# Patient Record
Sex: FEMALE | Race: WHITE | NOT HISPANIC OR LATINO
[De-identification: names, ages, dates, MRNs, and addresses within clinical notes are randomized per-mention and may not be internally consistent; named-entity substitution may affect disease eponyms.]

---

## 2019-03-12 ENCOUNTER — RESULT REVIEW (OUTPATIENT)
Age: 29
End: 2019-03-12

## 2019-05-07 PROBLEM — Z00.00 ENCOUNTER FOR PREVENTIVE HEALTH EXAMINATION: Status: ACTIVE | Noted: 2019-05-07

## 2019-06-06 ENCOUNTER — APPOINTMENT (OUTPATIENT)
Dept: ANTEPARTUM | Facility: CLINIC | Age: 29
End: 2019-06-06
Payer: COMMERCIAL

## 2019-06-06 ENCOUNTER — ASOB RESULT (OUTPATIENT)
Age: 29
End: 2019-06-06

## 2019-06-06 PROCEDURE — 76813 OB US NUCHAL MEAS 1 GEST: CPT

## 2019-06-06 PROCEDURE — 76801 OB US < 14 WKS SINGLE FETUS: CPT

## 2019-06-06 PROCEDURE — 36416 COLLJ CAPILLARY BLOOD SPEC: CPT

## 2019-07-18 ENCOUNTER — APPOINTMENT (OUTPATIENT)
Dept: ANTEPARTUM | Facility: CLINIC | Age: 29
End: 2019-07-18
Payer: COMMERCIAL

## 2019-07-18 ENCOUNTER — ASOB RESULT (OUTPATIENT)
Age: 29
End: 2019-07-18

## 2019-07-18 PROCEDURE — 76805 OB US >/= 14 WKS SNGL FETUS: CPT

## 2019-10-01 ENCOUNTER — APPOINTMENT (OUTPATIENT)
Dept: MATERNAL FETAL MEDICINE | Facility: CLINIC | Age: 29
End: 2019-10-01
Payer: COMMERCIAL

## 2019-10-01 ENCOUNTER — ASOB RESULT (OUTPATIENT)
Age: 29
End: 2019-10-01

## 2019-10-01 DIAGNOSIS — O24.419 GESTATIONAL DIABETES MELLITUS IN PREGNANCY, UNSPECIFIED CONTROL: ICD-10-CM

## 2019-10-01 PROCEDURE — G0109 DIAB MANAGE TRN IND/GROUP: CPT

## 2019-10-01 RX ORDER — BLOOD SUGAR DIAGNOSTIC
STRIP MISCELLANEOUS 4 TIMES DAILY
Qty: 1 | Refills: 2 | Status: ACTIVE | COMMUNITY
Start: 2019-10-01 | End: 1900-01-01

## 2019-10-01 RX ORDER — LANCETS 30 GAUGE
EACH MISCELLANEOUS
Qty: 1 | Refills: 2 | Status: ACTIVE | COMMUNITY
Start: 2019-10-01 | End: 1900-01-01

## 2019-10-14 ENCOUNTER — APPOINTMENT (OUTPATIENT)
Dept: MATERNAL FETAL MEDICINE | Facility: CLINIC | Age: 29
End: 2019-10-14
Payer: COMMERCIAL

## 2019-10-14 ENCOUNTER — ASOB RESULT (OUTPATIENT)
Age: 29
End: 2019-10-14

## 2019-10-14 PROCEDURE — G0108 DIAB MANAGE TRN  PER INDIV: CPT

## 2019-10-24 ENCOUNTER — ASOB RESULT (OUTPATIENT)
Age: 29
End: 2019-10-24

## 2019-10-24 ENCOUNTER — APPOINTMENT (OUTPATIENT)
Dept: ANTEPARTUM | Facility: CLINIC | Age: 29
End: 2019-10-24
Payer: COMMERCIAL

## 2019-10-24 PROCEDURE — 76816 OB US FOLLOW-UP PER FETUS: CPT

## 2019-10-24 PROCEDURE — 76819 FETAL BIOPHYS PROFIL W/O NST: CPT

## 2019-10-24 PROCEDURE — 99242 OFF/OP CONSLTJ NEW/EST SF 20: CPT | Mod: 25

## 2019-11-06 ENCOUNTER — ASOB RESULT (OUTPATIENT)
Age: 29
End: 2019-11-06

## 2019-11-06 ENCOUNTER — APPOINTMENT (OUTPATIENT)
Dept: MATERNAL FETAL MEDICINE | Facility: CLINIC | Age: 29
End: 2019-11-06
Payer: COMMERCIAL

## 2019-11-06 PROCEDURE — G0108 DIAB MANAGE TRN  PER INDIV: CPT

## 2019-11-20 ENCOUNTER — APPOINTMENT (OUTPATIENT)
Dept: ANTEPARTUM | Facility: CLINIC | Age: 29
End: 2019-11-20
Payer: COMMERCIAL

## 2019-11-20 ENCOUNTER — ASOB RESULT (OUTPATIENT)
Age: 29
End: 2019-11-20

## 2019-11-20 PROCEDURE — 76819 FETAL BIOPHYS PROFIL W/O NST: CPT

## 2019-11-20 PROCEDURE — 76816 OB US FOLLOW-UP PER FETUS: CPT

## 2019-11-25 ENCOUNTER — INPATIENT (INPATIENT)
Facility: HOSPITAL | Age: 29
LOS: 3 days | Discharge: HOME | End: 2019-11-29
Attending: OBSTETRICS & GYNECOLOGY | Admitting: OBSTETRICS & GYNECOLOGY

## 2019-11-25 VITALS
SYSTOLIC BLOOD PRESSURE: 141 MMHG | TEMPERATURE: 98 F | HEART RATE: 83 BPM | DIASTOLIC BLOOD PRESSURE: 92 MMHG | RESPIRATION RATE: 15 BRPM

## 2019-11-25 DIAGNOSIS — Z3A.00 WEEKS OF GESTATION OF PREGNANCY NOT SPECIFIED: ICD-10-CM

## 2019-11-25 DIAGNOSIS — O26.899 OTHER SPECIFIED PREGNANCY RELATED CONDITIONS, UNSPECIFIED TRIMESTER: ICD-10-CM

## 2019-11-25 LAB
APPEARANCE UR: CLEAR — SIGNIFICANT CHANGE UP
APTT BLD: 27 SEC — LOW (ref 27.5–36.3)
BASOPHILS # BLD AUTO: 0.01 K/UL — SIGNIFICANT CHANGE UP (ref 0–0.2)
BASOPHILS NFR BLD AUTO: 0.1 % — SIGNIFICANT CHANGE UP (ref 0–2)
BILIRUB UR-MCNC: NEGATIVE — SIGNIFICANT CHANGE UP
BLOOD UR QL VISUAL: NEGATIVE — SIGNIFICANT CHANGE UP
COLOR SPEC: COLORLESS — SIGNIFICANT CHANGE UP
CREAT ?TM UR-MCNC: 10.2 MG/DL — SIGNIFICANT CHANGE UP
EOSINOPHIL # BLD AUTO: 0.01 K/UL — SIGNIFICANT CHANGE UP (ref 0–0.5)
EOSINOPHIL NFR BLD AUTO: 0.1 % — SIGNIFICANT CHANGE UP (ref 0–6)
FIBRINOGEN PPP-MCNC: 798 MG/DL — HIGH (ref 350–510)
GLUCOSE BLDC GLUCOMTR-MCNC: 86 MG/DL — SIGNIFICANT CHANGE UP (ref 70–99)
GLUCOSE UR-MCNC: NEGATIVE — SIGNIFICANT CHANGE UP
HCT VFR BLD CALC: 39.3 % — SIGNIFICANT CHANGE UP (ref 34.5–45)
HGB BLD-MCNC: 13.9 G/DL — SIGNIFICANT CHANGE UP (ref 11.5–15.5)
IMM GRANULOCYTES NFR BLD AUTO: 0.2 % — SIGNIFICANT CHANGE UP (ref 0–1.5)
INR BLD: 0.96 — SIGNIFICANT CHANGE UP (ref 0.88–1.17)
KETONES UR-MCNC: HIGH
LEUKOCYTE ESTERASE UR-ACNC: NEGATIVE — SIGNIFICANT CHANGE UP
LYMPHOCYTES # BLD AUTO: 2.03 K/UL — SIGNIFICANT CHANGE UP (ref 1–3.3)
LYMPHOCYTES # BLD AUTO: 23.9 % — SIGNIFICANT CHANGE UP (ref 13–44)
MCHC RBC-ENTMCNC: 31.4 PG — SIGNIFICANT CHANGE UP (ref 27–34)
MCHC RBC-ENTMCNC: 35.4 % — SIGNIFICANT CHANGE UP (ref 32–36)
MCV RBC AUTO: 88.9 FL — SIGNIFICANT CHANGE UP (ref 80–100)
MONOCYTES # BLD AUTO: 0.56 K/UL — SIGNIFICANT CHANGE UP (ref 0–0.9)
MONOCYTES NFR BLD AUTO: 6.6 % — SIGNIFICANT CHANGE UP (ref 2–14)
NEUTROPHILS # BLD AUTO: 5.85 K/UL — SIGNIFICANT CHANGE UP (ref 1.8–7.4)
NEUTROPHILS NFR BLD AUTO: 69.1 % — SIGNIFICANT CHANGE UP (ref 43–77)
NITRITE UR-MCNC: NEGATIVE — SIGNIFICANT CHANGE UP
NRBC # FLD: 0 K/UL — SIGNIFICANT CHANGE UP (ref 0–0)
PH UR: 6.5 — SIGNIFICANT CHANGE UP (ref 5–8)
PLATELET # BLD AUTO: 254 K/UL — SIGNIFICANT CHANGE UP (ref 150–400)
PMV BLD: 10 FL — SIGNIFICANT CHANGE UP (ref 7–13)
PROT UR-MCNC: NEGATIVE — SIGNIFICANT CHANGE UP
PROTHROM AB SERPL-ACNC: 10.9 SEC — SIGNIFICANT CHANGE UP (ref 9.8–13.1)
RBC # BLD: 4.42 M/UL — SIGNIFICANT CHANGE UP (ref 3.8–5.2)
RBC # FLD: 12.4 % — SIGNIFICANT CHANGE UP (ref 10.3–14.5)
SP GR SPEC: 1 — LOW (ref 1–1.04)
UROBILINOGEN FLD QL: NORMAL — SIGNIFICANT CHANGE UP
WBC # BLD: 8.48 K/UL — SIGNIFICANT CHANGE UP (ref 3.8–10.5)
WBC # FLD AUTO: 8.48 K/UL — SIGNIFICANT CHANGE UP (ref 3.8–10.5)

## 2019-11-25 RX ORDER — SODIUM CHLORIDE 9 MG/ML
3 INJECTION INTRAMUSCULAR; INTRAVENOUS; SUBCUTANEOUS EVERY 8 HOURS
Refills: 0 | Status: DISCONTINUED | OUTPATIENT
Start: 2019-11-25 | End: 2019-11-27

## 2019-11-25 NOTE — OB PROVIDER TRIAGE NOTE - NSOBPROVIDERNOTE_OBGYN_ALL_OB_FT
30yo  @38.0wks presents with elevated BP and p/c ratio of .39 concurrent with PEC    Plan:  -admit to L&D  -[facility]LL[facility] labs q6h  -IVH  -efm, toco  -4PO    d/w Dr. William Riley, PGY1

## 2019-11-25 NOTE — OB PROVIDER TRIAGE NOTE - HISTORY OF PRESENT ILLNESS
28yo  @38wks presents with elevated BP in office today.  She states BP was 124/90 30yo  @38wks presents with elevated BP in office today.  She states BP was 124/90 with 3x repeats of similar values in the office.  Upon arrival here she had 144/92 and 144/96.  Patient is asymptomatic and denies headache/blurry vision/SOB/RUQ pain/swelling/nausea/vomiting/fevers.  LOF-, Ctx-, FM+, VB-.    PNC: GDMA1  OBhx: denies  PMH: denies  PSH: denies  Meds: denies  Allergies: NKDA    GBS: pos  EFW: 3000

## 2019-11-26 LAB
ALBUMIN SERPL ELPH-MCNC: 3.1 G/DL — LOW (ref 3.3–5)
ALBUMIN SERPL ELPH-MCNC: 3.4 G/DL — SIGNIFICANT CHANGE UP (ref 3.3–5)
ALBUMIN SERPL ELPH-MCNC: 3.6 G/DL — SIGNIFICANT CHANGE UP (ref 3.3–5)
ALP SERPL-CCNC: 124 U/L — HIGH (ref 40–120)
ALP SERPL-CCNC: 127 U/L — HIGH (ref 40–120)
ALP SERPL-CCNC: 136 U/L — HIGH (ref 40–120)
ALT FLD-CCNC: 7 U/L — SIGNIFICANT CHANGE UP (ref 4–33)
ALT FLD-CCNC: 8 U/L — SIGNIFICANT CHANGE UP (ref 4–33)
ALT FLD-CCNC: 9 U/L — SIGNIFICANT CHANGE UP (ref 4–33)
ANION GAP SERPL CALC-SCNC: 11 MMO/L — SIGNIFICANT CHANGE UP (ref 7–14)
ANION GAP SERPL CALC-SCNC: 14 MMO/L — SIGNIFICANT CHANGE UP (ref 7–14)
ANION GAP SERPL CALC-SCNC: 15 MMO/L — HIGH (ref 7–14)
APTT BLD: 26.5 SEC — LOW (ref 27.5–36.3)
APTT BLD: 29 SEC — SIGNIFICANT CHANGE UP (ref 27.5–36.3)
AST SERPL-CCNC: 15 U/L — SIGNIFICANT CHANGE UP (ref 4–32)
AST SERPL-CCNC: 15 U/L — SIGNIFICANT CHANGE UP (ref 4–32)
AST SERPL-CCNC: 18 U/L — SIGNIFICANT CHANGE UP (ref 4–32)
BASOPHILS # BLD AUTO: 0.01 K/UL — SIGNIFICANT CHANGE UP (ref 0–0.2)
BASOPHILS # BLD AUTO: 0.02 K/UL — SIGNIFICANT CHANGE UP (ref 0–0.2)
BASOPHILS NFR BLD AUTO: 0.1 % — SIGNIFICANT CHANGE UP (ref 0–2)
BASOPHILS NFR BLD AUTO: 0.4 % — SIGNIFICANT CHANGE UP (ref 0–2)
BILIRUB SERPL-MCNC: 0.5 MG/DL — SIGNIFICANT CHANGE UP (ref 0.2–1.2)
BILIRUB SERPL-MCNC: 0.5 MG/DL — SIGNIFICANT CHANGE UP (ref 0.2–1.2)
BILIRUB SERPL-MCNC: 0.6 MG/DL — SIGNIFICANT CHANGE UP (ref 0.2–1.2)
BUN SERPL-MCNC: 3 MG/DL — LOW (ref 7–23)
BUN SERPL-MCNC: 5 MG/DL — LOW (ref 7–23)
BUN SERPL-MCNC: 6 MG/DL — LOW (ref 7–23)
CALCIUM SERPL-MCNC: 8.3 MG/DL — LOW (ref 8.4–10.5)
CALCIUM SERPL-MCNC: 8.5 MG/DL — SIGNIFICANT CHANGE UP (ref 8.4–10.5)
CALCIUM SERPL-MCNC: 8.9 MG/DL — SIGNIFICANT CHANGE UP (ref 8.4–10.5)
CHLORIDE SERPL-SCNC: 104 MMOL/L — SIGNIFICANT CHANGE UP (ref 98–107)
CHLORIDE SERPL-SCNC: 108 MMOL/L — HIGH (ref 98–107)
CHLORIDE SERPL-SCNC: 112 MMOL/L — HIGH (ref 98–107)
CO2 SERPL-SCNC: 16 MMOL/L — LOW (ref 22–31)
CO2 SERPL-SCNC: 17 MMOL/L — LOW (ref 22–31)
CO2 SERPL-SCNC: 18 MMOL/L — LOW (ref 22–31)
CREAT SERPL-MCNC: 0.39 MG/DL — LOW (ref 0.5–1.3)
CREAT SERPL-MCNC: 0.41 MG/DL — LOW (ref 0.5–1.3)
CREAT SERPL-MCNC: 0.44 MG/DL — LOW (ref 0.5–1.3)
EOSINOPHIL # BLD AUTO: 0.01 K/UL — SIGNIFICANT CHANGE UP (ref 0–0.5)
EOSINOPHIL # BLD AUTO: 0.05 K/UL — SIGNIFICANT CHANGE UP (ref 0–0.5)
EOSINOPHIL NFR BLD AUTO: 0.2 % — SIGNIFICANT CHANGE UP (ref 0–6)
EOSINOPHIL NFR BLD AUTO: 0.5 % — SIGNIFICANT CHANGE UP (ref 0–6)
FIBRINOGEN PPP-MCNC: 653 MG/DL — HIGH (ref 350–510)
FIBRINOGEN PPP-MCNC: 846 MG/DL — HIGH (ref 350–510)
GLUCOSE BLDC GLUCOMTR-MCNC: 105 MG/DL — HIGH (ref 70–99)
GLUCOSE BLDC GLUCOMTR-MCNC: 108 MG/DL — HIGH (ref 70–99)
GLUCOSE BLDC GLUCOMTR-MCNC: 166 MG/DL — HIGH (ref 70–99)
GLUCOSE BLDC GLUCOMTR-MCNC: 89 MG/DL — SIGNIFICANT CHANGE UP (ref 70–99)
GLUCOSE BLDC GLUCOMTR-MCNC: 94 MG/DL — SIGNIFICANT CHANGE UP (ref 70–99)
GLUCOSE SERPL-MCNC: 113 MG/DL — HIGH (ref 70–99)
GLUCOSE SERPL-MCNC: 85 MG/DL — SIGNIFICANT CHANGE UP (ref 70–99)
GLUCOSE SERPL-MCNC: 87 MG/DL — SIGNIFICANT CHANGE UP (ref 70–99)
HCT VFR BLD CALC: 38.5 % — SIGNIFICANT CHANGE UP (ref 34.5–45)
HCT VFR BLD CALC: 39.5 % — SIGNIFICANT CHANGE UP (ref 34.5–45)
HGB BLD-MCNC: 13.3 G/DL — SIGNIFICANT CHANGE UP (ref 11.5–15.5)
HGB BLD-MCNC: 13.3 G/DL — SIGNIFICANT CHANGE UP (ref 11.5–15.5)
IMM GRANULOCYTES NFR BLD AUTO: 0.2 % — SIGNIFICANT CHANGE UP (ref 0–1.5)
IMM GRANULOCYTES NFR BLD AUTO: 0.2 % — SIGNIFICANT CHANGE UP (ref 0–1.5)
INR BLD: 0.96 — SIGNIFICANT CHANGE UP (ref 0.88–1.17)
INR BLD: 0.97 — SIGNIFICANT CHANGE UP (ref 0.88–1.17)
LDH SERPL L TO P-CCNC: 128 U/L — LOW (ref 135–225)
LDH SERPL L TO P-CCNC: 146 U/L — SIGNIFICANT CHANGE UP (ref 135–225)
LDH SERPL L TO P-CCNC: 170 U/L — SIGNIFICANT CHANGE UP (ref 135–225)
LYMPHOCYTES # BLD AUTO: 1.52 K/UL — SIGNIFICANT CHANGE UP (ref 1–3.3)
LYMPHOCYTES # BLD AUTO: 1.77 K/UL — SIGNIFICANT CHANGE UP (ref 1–3.3)
LYMPHOCYTES # BLD AUTO: 19.1 % — SIGNIFICANT CHANGE UP (ref 13–44)
LYMPHOCYTES # BLD AUTO: 28.4 % — SIGNIFICANT CHANGE UP (ref 13–44)
MCHC RBC-ENTMCNC: 30.6 PG — SIGNIFICANT CHANGE UP (ref 27–34)
MCHC RBC-ENTMCNC: 31.1 PG — SIGNIFICANT CHANGE UP (ref 27–34)
MCHC RBC-ENTMCNC: 33.7 % — SIGNIFICANT CHANGE UP (ref 32–36)
MCHC RBC-ENTMCNC: 34.5 % — SIGNIFICANT CHANGE UP (ref 32–36)
MCV RBC AUTO: 90.2 FL — SIGNIFICANT CHANGE UP (ref 80–100)
MCV RBC AUTO: 90.8 FL — SIGNIFICANT CHANGE UP (ref 80–100)
MONOCYTES # BLD AUTO: 0.43 K/UL — SIGNIFICANT CHANGE UP (ref 0–0.9)
MONOCYTES # BLD AUTO: 0.64 K/UL — SIGNIFICANT CHANGE UP (ref 0–0.9)
MONOCYTES NFR BLD AUTO: 6.9 % — SIGNIFICANT CHANGE UP (ref 2–14)
MONOCYTES NFR BLD AUTO: 8 % — SIGNIFICANT CHANGE UP (ref 2–14)
NEUTROPHILS # BLD AUTO: 3.37 K/UL — SIGNIFICANT CHANGE UP (ref 1.8–7.4)
NEUTROPHILS # BLD AUTO: 6.76 K/UL — SIGNIFICANT CHANGE UP (ref 1.8–7.4)
NEUTROPHILS NFR BLD AUTO: 62.8 % — SIGNIFICANT CHANGE UP (ref 43–77)
NEUTROPHILS NFR BLD AUTO: 73.2 % — SIGNIFICANT CHANGE UP (ref 43–77)
NRBC # FLD: 0 K/UL — SIGNIFICANT CHANGE UP (ref 0–0)
NRBC # FLD: 0 K/UL — SIGNIFICANT CHANGE UP (ref 0–0)
PLATELET # BLD AUTO: 231 K/UL — SIGNIFICANT CHANGE UP (ref 150–400)
PLATELET # BLD AUTO: 244 K/UL — SIGNIFICANT CHANGE UP (ref 150–400)
PMV BLD: 10 FL — SIGNIFICANT CHANGE UP (ref 7–13)
PMV BLD: 9.9 FL — SIGNIFICANT CHANGE UP (ref 7–13)
POTASSIUM SERPL-MCNC: 3.1 MMOL/L — LOW (ref 3.5–5.3)
POTASSIUM SERPL-MCNC: 3.3 MMOL/L — LOW (ref 3.5–5.3)
POTASSIUM SERPL-MCNC: 3.4 MMOL/L — LOW (ref 3.5–5.3)
POTASSIUM SERPL-SCNC: 3.1 MMOL/L — LOW (ref 3.5–5.3)
POTASSIUM SERPL-SCNC: 3.3 MMOL/L — LOW (ref 3.5–5.3)
POTASSIUM SERPL-SCNC: 3.4 MMOL/L — LOW (ref 3.5–5.3)
PROT SERPL-MCNC: 6 G/DL — SIGNIFICANT CHANGE UP (ref 6–8.3)
PROT SERPL-MCNC: 6.1 G/DL — SIGNIFICANT CHANGE UP (ref 6–8.3)
PROT SERPL-MCNC: 6.9 G/DL — SIGNIFICANT CHANGE UP (ref 6–8.3)
PROT UR-MCNC: < 4 MG/DL — SIGNIFICANT CHANGE UP
PROTHROM AB SERPL-ACNC: 10.9 SEC — SIGNIFICANT CHANGE UP (ref 9.8–13.1)
PROTHROM AB SERPL-ACNC: 11 SEC — SIGNIFICANT CHANGE UP (ref 9.8–13.1)
RBC # BLD: 4.27 M/UL — SIGNIFICANT CHANGE UP (ref 3.8–5.2)
RBC # BLD: 4.35 M/UL — SIGNIFICANT CHANGE UP (ref 3.8–5.2)
RBC # FLD: 12.6 % — SIGNIFICANT CHANGE UP (ref 10.3–14.5)
RBC # FLD: 12.7 % — SIGNIFICANT CHANGE UP (ref 10.3–14.5)
SODIUM SERPL-SCNC: 137 MMOL/L — SIGNIFICANT CHANGE UP (ref 135–145)
SODIUM SERPL-SCNC: 138 MMOL/L — SIGNIFICANT CHANGE UP (ref 135–145)
SODIUM SERPL-SCNC: 140 MMOL/L — SIGNIFICANT CHANGE UP (ref 135–145)
T PALLIDUM AB TITR SER: NEGATIVE — SIGNIFICANT CHANGE UP
URATE SERPL-MCNC: 3.6 MG/DL — SIGNIFICANT CHANGE UP (ref 2.5–7)
URATE SERPL-MCNC: 4.1 MG/DL — SIGNIFICANT CHANGE UP (ref 2.5–7)
URATE SERPL-MCNC: 4.2 MG/DL — SIGNIFICANT CHANGE UP (ref 2.5–7)
WBC # BLD: 5.36 K/UL — SIGNIFICANT CHANGE UP (ref 3.8–10.5)
WBC # BLD: 9.25 K/UL — SIGNIFICANT CHANGE UP (ref 3.8–10.5)
WBC # FLD AUTO: 5.36 K/UL — SIGNIFICANT CHANGE UP (ref 3.8–10.5)
WBC # FLD AUTO: 9.25 K/UL — SIGNIFICANT CHANGE UP (ref 3.8–10.5)

## 2019-11-26 RX ORDER — AMPICILLIN TRIHYDRATE 250 MG
1 CAPSULE ORAL EVERY 4 HOURS
Refills: 0 | Status: DISCONTINUED | OUTPATIENT
Start: 2019-11-26 | End: 2019-11-26

## 2019-11-26 RX ORDER — CITRIC ACID/SODIUM CITRATE 300-500 MG
15 SOLUTION, ORAL ORAL EVERY 6 HOURS
Refills: 0 | Status: DISCONTINUED | OUTPATIENT
Start: 2019-11-26 | End: 2019-11-27

## 2019-11-26 RX ORDER — SODIUM CHLORIDE 9 MG/ML
1000 INJECTION, SOLUTION INTRAVENOUS
Refills: 0 | Status: DISCONTINUED | OUTPATIENT
Start: 2019-11-26 | End: 2019-11-27

## 2019-11-26 RX ORDER — VANCOMYCIN HCL 1 G
VIAL (EA) INTRAVENOUS
Refills: 0 | Status: DISCONTINUED | OUTPATIENT
Start: 2019-11-26 | End: 2019-11-27

## 2019-11-26 RX ORDER — POTASSIUM CHLORIDE 20 MEQ
40 PACKET (EA) ORAL EVERY 4 HOURS
Refills: 0 | Status: COMPLETED | OUTPATIENT
Start: 2019-11-26 | End: 2019-11-27

## 2019-11-26 RX ORDER — AMPICILLIN TRIHYDRATE 250 MG
2 CAPSULE ORAL ONCE
Refills: 0 | Status: COMPLETED | OUTPATIENT
Start: 2019-11-26 | End: 2019-11-26

## 2019-11-26 RX ORDER — SODIUM CHLORIDE 9 MG/ML
1000 INJECTION INTRAMUSCULAR; INTRAVENOUS; SUBCUTANEOUS
Refills: 0 | Status: COMPLETED | OUTPATIENT
Start: 2019-11-26 | End: 2019-11-26

## 2019-11-26 RX ORDER — BUTORPHANOL TARTRATE 2 MG/ML
0.2 INJECTION, SOLUTION INTRAMUSCULAR; INTRAVENOUS ONCE
Refills: 0 | Status: DISCONTINUED | OUTPATIENT
Start: 2019-11-26 | End: 2019-11-26

## 2019-11-26 RX ORDER — SODIUM CHLORIDE 9 MG/ML
1000 INJECTION INTRAMUSCULAR; INTRAVENOUS; SUBCUTANEOUS
Refills: 0 | Status: DISCONTINUED | OUTPATIENT
Start: 2019-11-26 | End: 2019-11-26

## 2019-11-26 RX ORDER — DIPHENHYDRAMINE HCL 50 MG
25 CAPSULE ORAL ONCE
Refills: 0 | Status: COMPLETED | OUTPATIENT
Start: 2019-11-26 | End: 2019-11-26

## 2019-11-26 RX ORDER — CITRIC ACID/SODIUM CITRATE 300-500 MG
15 SOLUTION, ORAL ORAL EVERY 6 HOURS
Refills: 0 | Status: DISCONTINUED | OUTPATIENT
Start: 2019-11-26 | End: 2019-11-26

## 2019-11-26 RX ORDER — VANCOMYCIN HCL 1 G
1000 VIAL (EA) INTRAVENOUS ONCE
Refills: 0 | Status: COMPLETED | OUTPATIENT
Start: 2019-11-26 | End: 2019-11-26

## 2019-11-26 RX ORDER — VANCOMYCIN HCL 1 G
1000 VIAL (EA) INTRAVENOUS EVERY 12 HOURS
Refills: 0 | Status: DISCONTINUED | OUTPATIENT
Start: 2019-11-26 | End: 2019-11-27

## 2019-11-26 RX ORDER — OXYTOCIN 10 UNIT/ML
333.33 VIAL (ML) INJECTION
Qty: 20 | Refills: 0 | Status: DISCONTINUED | OUTPATIENT
Start: 2019-11-26 | End: 2019-11-27

## 2019-11-26 RX ORDER — SODIUM CHLORIDE 9 MG/ML
1000 INJECTION, SOLUTION INTRAVENOUS
Refills: 0 | Status: DISCONTINUED | OUTPATIENT
Start: 2019-11-26 | End: 2019-11-26

## 2019-11-26 RX ORDER — BUTORPHANOL TARTRATE 2 MG/ML
2 INJECTION, SOLUTION INTRAMUSCULAR; INTRAVENOUS ONCE
Refills: 0 | Status: DISCONTINUED | OUTPATIENT
Start: 2019-11-26 | End: 2019-11-26

## 2019-11-26 RX ORDER — OXYTOCIN 10 UNIT/ML
333.33 VIAL (ML) INJECTION
Qty: 20 | Refills: 0 | Status: DISCONTINUED | OUTPATIENT
Start: 2019-11-26 | End: 2019-11-26

## 2019-11-26 RX ADMIN — Medication 25 MILLIGRAM(S): at 04:22

## 2019-11-26 RX ADMIN — SODIUM CHLORIDE 3 MILLILITER(S): 9 INJECTION INTRAMUSCULAR; INTRAVENOUS; SUBCUTANEOUS at 05:26

## 2019-11-26 RX ADMIN — BUTORPHANOL TARTRATE 2 MILLIGRAM(S): 2 INJECTION, SOLUTION INTRAMUSCULAR; INTRAVENOUS at 21:53

## 2019-11-26 RX ADMIN — Medication 216 GRAM(S): at 03:06

## 2019-11-26 RX ADMIN — Medication 250 MILLIGRAM(S): at 19:38

## 2019-11-26 RX ADMIN — BUTORPHANOL TARTRATE 2 MILLIGRAM(S): 2 INJECTION, SOLUTION INTRAMUSCULAR; INTRAVENOUS at 21:23

## 2019-11-26 RX ADMIN — Medication 250 MILLIGRAM(S): at 07:54

## 2019-11-26 RX ADMIN — SODIUM CHLORIDE 125 MILLILITER(S): 9 INJECTION INTRAMUSCULAR; INTRAVENOUS; SUBCUTANEOUS at 22:35

## 2019-11-26 NOTE — CHART NOTE - NSCHARTNOTEFT_GEN_A_CORE
Patient assessed at bedside for cervical change.  She requests something for pain at this time.    VS  T(C): 36.6 (11-26-19 @ 13:57)  HR: 76 (11-26-19 @ 18:50)  BP: 139/83 (11-26-19 @ 18:50)  RR: 16 (11-26-19 @ 18:50)  SpO2: 99% (11-26-19 @ 18:50)    SVE:1/80/-3  EFM:   [place]:    Plan:  -c/w PO cytotec  -Stadol now    d/w Dr. Kassidy Riley, PGY1 Patient assessed at bedside for cervical change.  She requests something for pain at this time.    VS  T(C): 36.6 (11-26-19 @ 13:57)  HR: 76 (11-26-19 @ 18:50)  BP: 139/83 (11-26-19 @ 18:50)  RR: 16 (11-26-19 @ 18:50)  SpO2: 99% (11-26-19 @ 18:50)    SVE:1/80/-3  EFM: 140, mod, +accels, -decels  [place]: q2-5min    Plan:  -c/w PO cytotec  -Stadol now    d/w Dr. Kassidy Riley, PGY1

## 2019-11-26 NOTE — OB PROVIDER H&P - ASSESSMENT
28yo  @38.0wks IOL  PEC    Plan:  -admit to L&D  -[facility] labs q6h  -IVH  -efm, toco  -ampicillin  -4PO    d/w Dr. William Riley, PGY1

## 2019-11-26 NOTE — CHART NOTE - NSCHARTNOTEFT_GEN_A_CORE
PGY1 Note    Patient evaluated for cervical change and placement of cervical balloon.      VS  T(C): 36.8 (11-26-19 @ 10:03)  HR: 85 (11-26-19 @ 11:48)  BP: 138/94 (11-26-19 @ 11:48)  RR: 16 (11-26-19 @ 10:03)  SpO2: 99% (11-26-19 @ 10:03)    SVE: 0.5/50/-3, posterior   FHR: 135/mod dafne/+acel/-decel: cat 1  [place]: Irregular ctx pattern seen    CB unable to be placed; will further ripen with PO Cytotec  Attempt CB later    D/w Dr. Kassidy Dowling PGY1

## 2019-11-26 NOTE — CHART NOTE - NSCHARTNOTEFT_GEN_A_CORE
PGY1 Note    Patient evaluated for cervical change and placement of cook balloon.     VS  T(C): 36.6 (11-26-19 @ 13:57)  HR: 92 (11-26-19 @ 13:57)  BP: 128/87 (11-26-19 @ 13:57)  RR: 18 (11-26-19 @ 13:57)  SpO2: 99% (11-26-19 @ 13:57)      SVE: 0.5/50/-3, softer, more anterior   FHR: 140/mod dafne/+acel/-decel: cat 1  [place]: ctx q2    2nd attempt at placement of cervical balloon unsuccessful   -Consider placing vaginal Cytotec once patient completes PO  -Will place cook balloon later in the evening    d/w Dr. Kassidy Dowling PGY1

## 2019-11-26 NOTE — OB PROVIDER H&P - HISTORY OF PRESENT ILLNESS
30yo  @38wks presents with elevated BP in office today.  She states BP was 124/90 with 3x repeats of similar values in the office.  Upon arrival here she had 144/92 and 144/96.  Patient is asymptomatic and denies headache/blurry vision/SOB/RUQ pain/swelling/nausea/vomiting/fevers.  LOF-, Ctx-, FM+, VB-.    PNC: GDMA1  OBhx: denies  PMH: denies  PSH: denies  Meds: denies  Allergies: NKDA    GBS: pos  EFW: 3000

## 2019-11-26 NOTE — CHART NOTE - NSCHARTNOTEFT_GEN_A_CORE
Patient assessed at bedside for reports of rash since ampicillin administration.  Rash is confined to neck and shoulders and is itchy, without hives present.  Patient reports no difficulties breathing.  She has a known allergy to sulfa drugs and now explains that in childhood she would be allergic to random medications but sensitivities were never done.      Administering benadryl to patient and changing GBS prophylactic abx to Vancomycin.    d/w Dr. Blade Riley, PGY1

## 2019-11-27 ENCOUNTER — TRANSCRIPTION ENCOUNTER (OUTPATIENT)
Age: 29
End: 2019-11-27

## 2019-11-27 LAB
ANION GAP SERPL CALC-SCNC: 13 MMO/L — SIGNIFICANT CHANGE UP (ref 7–14)
BLD GP AB SCN SERPL QL: NEGATIVE — SIGNIFICANT CHANGE UP
BUN SERPL-MCNC: 8 MG/DL — SIGNIFICANT CHANGE UP (ref 7–23)
CALCIUM SERPL-MCNC: 8.4 MG/DL — SIGNIFICANT CHANGE UP (ref 8.4–10.5)
CHLORIDE SERPL-SCNC: 107 MMOL/L — SIGNIFICANT CHANGE UP (ref 98–107)
CO2 SERPL-SCNC: 18 MMOL/L — LOW (ref 22–31)
CREAT SERPL-MCNC: 0.64 MG/DL — SIGNIFICANT CHANGE UP (ref 0.5–1.3)
GLUCOSE BLDC GLUCOMTR-MCNC: 88 MG/DL — SIGNIFICANT CHANGE UP (ref 70–99)
GLUCOSE SERPL-MCNC: 134 MG/DL — HIGH (ref 70–99)
POTASSIUM SERPL-MCNC: 3.8 MMOL/L — SIGNIFICANT CHANGE UP (ref 3.5–5.3)
POTASSIUM SERPL-SCNC: 3.8 MMOL/L — SIGNIFICANT CHANGE UP (ref 3.5–5.3)
RH IG SCN BLD-IMP: POSITIVE — SIGNIFICANT CHANGE UP
SODIUM SERPL-SCNC: 138 MMOL/L — SIGNIFICANT CHANGE UP (ref 135–145)

## 2019-11-27 RX ORDER — HYDROCORTISONE 1 %
1 OINTMENT (GRAM) TOPICAL EVERY 6 HOURS
Refills: 0 | Status: DISCONTINUED | OUTPATIENT
Start: 2019-11-27 | End: 2019-11-29

## 2019-11-27 RX ORDER — DIBUCAINE 1 %
1 OINTMENT (GRAM) RECTAL EVERY 6 HOURS
Refills: 0 | Status: DISCONTINUED | OUTPATIENT
Start: 2019-11-27 | End: 2019-11-29

## 2019-11-27 RX ORDER — KETOROLAC TROMETHAMINE 30 MG/ML
30 SYRINGE (ML) INJECTION ONCE
Refills: 0 | Status: DISCONTINUED | OUTPATIENT
Start: 2019-11-27 | End: 2019-11-27

## 2019-11-27 RX ORDER — BENZOCAINE 10 %
1 GEL (GRAM) MUCOUS MEMBRANE EVERY 6 HOURS
Refills: 0 | Status: DISCONTINUED | OUTPATIENT
Start: 2019-11-27 | End: 2019-11-29

## 2019-11-27 RX ORDER — OXYTOCIN 10 UNIT/ML
333.33 VIAL (ML) INJECTION
Qty: 20 | Refills: 0 | Status: DISCONTINUED | OUTPATIENT
Start: 2019-11-27 | End: 2019-11-27

## 2019-11-27 RX ORDER — SIMETHICONE 80 MG/1
80 TABLET, CHEWABLE ORAL EVERY 4 HOURS
Refills: 0 | Status: DISCONTINUED | OUTPATIENT
Start: 2019-11-27 | End: 2019-11-29

## 2019-11-27 RX ORDER — GLYCERIN ADULT
1 SUPPOSITORY, RECTAL RECTAL AT BEDTIME
Refills: 0 | Status: DISCONTINUED | OUTPATIENT
Start: 2019-11-27 | End: 2019-11-29

## 2019-11-27 RX ORDER — IBUPROFEN 200 MG
600 TABLET ORAL EVERY 6 HOURS
Refills: 0 | Status: COMPLETED | OUTPATIENT
Start: 2019-11-27 | End: 2020-10-25

## 2019-11-27 RX ORDER — IBUPROFEN 200 MG
1 TABLET ORAL
Qty: 0 | Refills: 0 | DISCHARGE
Start: 2019-11-27

## 2019-11-27 RX ORDER — DIPHENHYDRAMINE HCL 50 MG
25 CAPSULE ORAL EVERY 6 HOURS
Refills: 0 | Status: DISCONTINUED | OUTPATIENT
Start: 2019-11-27 | End: 2019-11-29

## 2019-11-27 RX ORDER — IBUPROFEN 200 MG
600 TABLET ORAL EVERY 6 HOURS
Refills: 0 | Status: DISCONTINUED | OUTPATIENT
Start: 2019-11-27 | End: 2019-11-29

## 2019-11-27 RX ORDER — ACETAMINOPHEN 500 MG
975 TABLET ORAL
Refills: 0 | Status: DISCONTINUED | OUTPATIENT
Start: 2019-11-27 | End: 2019-11-29

## 2019-11-27 RX ORDER — AER TRAVELER 0.5 G/1
1 SOLUTION RECTAL; TOPICAL EVERY 4 HOURS
Refills: 0 | Status: DISCONTINUED | OUTPATIENT
Start: 2019-11-27 | End: 2019-11-29

## 2019-11-27 RX ORDER — TETANUS TOXOID, REDUCED DIPHTHERIA TOXOID AND ACELLULAR PERTUSSIS VACCINE, ADSORBED 5; 2.5; 8; 8; 2.5 [IU]/.5ML; [IU]/.5ML; UG/.5ML; UG/.5ML; UG/.5ML
0.5 SUSPENSION INTRAMUSCULAR ONCE
Refills: 0 | Status: DISCONTINUED | OUTPATIENT
Start: 2019-11-27 | End: 2019-11-29

## 2019-11-27 RX ORDER — SODIUM CHLORIDE 9 MG/ML
3 INJECTION INTRAMUSCULAR; INTRAVENOUS; SUBCUTANEOUS EVERY 8 HOURS
Refills: 0 | Status: DISCONTINUED | OUTPATIENT
Start: 2019-11-27 | End: 2019-11-29

## 2019-11-27 RX ORDER — MAGNESIUM HYDROXIDE 400 MG/1
30 TABLET, CHEWABLE ORAL
Refills: 0 | Status: DISCONTINUED | OUTPATIENT
Start: 2019-11-27 | End: 2019-11-29

## 2019-11-27 RX ORDER — LANOLIN
1 OINTMENT (GRAM) TOPICAL EVERY 6 HOURS
Refills: 0 | Status: DISCONTINUED | OUTPATIENT
Start: 2019-11-27 | End: 2019-11-29

## 2019-11-27 RX ORDER — ACETAMINOPHEN 500 MG
3 TABLET ORAL
Qty: 0 | Refills: 0 | DISCHARGE
Start: 2019-11-27

## 2019-11-27 RX ORDER — PRAMOXINE HYDROCHLORIDE 150 MG/15G
1 AEROSOL, FOAM RECTAL EVERY 4 HOURS
Refills: 0 | Status: DISCONTINUED | OUTPATIENT
Start: 2019-11-27 | End: 2019-11-29

## 2019-11-27 RX ADMIN — Medication 600 MILLIGRAM(S): at 12:13

## 2019-11-27 RX ADMIN — Medication 600 MILLIGRAM(S): at 11:36

## 2019-11-27 RX ADMIN — Medication 40 MILLIEQUIVALENT(S): at 00:10

## 2019-11-27 RX ADMIN — SODIUM CHLORIDE 3 MILLILITER(S): 9 INJECTION INTRAMUSCULAR; INTRAVENOUS; SUBCUTANEOUS at 22:40

## 2019-11-27 RX ADMIN — Medication 600 MILLIGRAM(S): at 18:15

## 2019-11-27 RX ADMIN — Medication 1000 MILLIUNIT(S)/MIN: at 05:26

## 2019-11-27 RX ADMIN — Medication 1 TABLET(S): at 11:36

## 2019-11-27 RX ADMIN — Medication 40 MILLIEQUIVALENT(S): at 04:01

## 2019-11-27 RX ADMIN — Medication 1000 MILLIUNIT(S)/MIN: at 09:30

## 2019-11-27 RX ADMIN — Medication 600 MILLIGRAM(S): at 19:15

## 2019-11-27 RX ADMIN — SODIUM CHLORIDE 3 MILLILITER(S): 9 INJECTION INTRAMUSCULAR; INTRAVENOUS; SUBCUTANEOUS at 13:40

## 2019-11-27 NOTE — OB RN DELIVERY SUMMARY - NS_SEPSISRSKCALC_OBGYN_ALL_OB_FT
EOS calculated successfully. EOS Risk Factor: 0.5/1000 live births ([facility] national incidence); GA=38w2d; Temp=98.42; ROM=10.9; GBS='Positive'; Antibiotics='No antibiotics or any antibiotics < 2 hrs prior to birth'

## 2019-11-27 NOTE — DISCHARGE NOTE OB - CARE PLAN
Goal:	delivery  Assessment and plan of treatment:	f/u in 2wk for bp check Principal Discharge DX:	Vaginal delivery  Goal:	delivery  Assessment and plan of treatment:	f/u in 2wk for bp check

## 2019-11-27 NOTE — OB RN DELIVERY SUMMARY - BABY A: APGAR 5 MIN SCORE, DELIVERY
[address] [place] [address]   Respiratory Therapy Flowsheet      Kat Hernandez  1952       Patient's carry-over of treatment delivered by: None noted. Objective Daily Findings    Comments:No distress noted.      Respiratory Muscle Functioning/Exercise Conditioning/Strengthening Session:    Time In: 1430  Time Out::1625  Session Number: 25   O2 with Therapy: 6 L/min    Pre SPO2 95  Pre HR:106  Pre BP: 150/77    RR: 20    Wt: 181.7  Temp: 98.4   Post SPO2: 96 Post HR: 87  Post BP: 132/77    Self Care Home Management Instruction/Education    Patient Self Monitored Activity Time In:1530 Time Out:1625     [] - Breathing Retraining   [] - Smoking Cessation   [] - Exercise Concepts   [] - Nutrition    [] - Collaborative Self-Management  [] - Secretion Clearance and Home Management    [] - Body Mechanics & Energy Conservation/Work Simplification (EC/WS) Techniques    [] - COPD & Lung Disease   [] - Travel & Environment  [] - Relaxation Techniques   [] - Medication & Side Effects   [] - Medication Adverse Interactions   [] - Oxygen Training   [] - Metered Dose Inhaler (MDI)   [] - Stress Management   [] - Sexuality & Lung Disease  [] - Fall Risk Precautions      Patients response to Education [x] - Patient actively participated in education  [] - Patient disengaged during education     [x] - Able to recite main objectives  [] - Unable to recite main objectives [] - Able to demonstrate    [x] - NA  [] - Unable to demonstrate       Comments: IMPORTANCE OF ADEQUATE SLEEP               Individual Therapy         Goal    Actual                      During Therapy                 Post-Therapy     % SpO2 HR PDS  1-10 PES  1-10 Pain  1 - 10 % SpO2 HR PDS  1-10 PES  1-10 Pain 0 - 10   Treadmill   1.5 mph  30 min 1.2 mph  17 min 88 121 4 15 1 95 117 1 1 1   Bike               Stepper L6  30 min L5  30 min 97 89 1 3 1 96 87 1 1 1   Arm Ergometer 20 llanes  20 min 15 llanes  13 min 96 94 1 3 1 96 93 1 1 1   Rower Weight Training               Therabands               Weighted DB               IMT 16 cm  20 min 15 cm 98 88 1 2 1 97 89 1 1 1   IS 1500 ml  25 min 1500 ml 97 90 1 3 1 97 89 1 1 1   PEP 16 cm 15 cm 100 80 1 2 1 98 88 1 1 1     Patient's response to therapy:     [] - ? Dyspnea    [] - ? Fatigue    [] - ? Heart rate    [] - ? Wheeze    [] - ? Cough     [] - ? Pain; location: _____________________________  [] - ? SpO2     [] - ? Pacing     [] - ? Pursed lip breathing   [] - ? Diaphragmatic breathing     [] - Experienced no problems; no cuing required.   [x] - Good effort     [] - Fair effort     [] - Poor effort    [x] - Good motivation      [] - Fair motivation     [] - Poor motivation    Comments  :    Assessment  Patient's response:   [] Patient unable to progress towards LTGs due to:  [] - Illness     [] - Weakness / debility [] - Poor effort     [] - Poor Motivation     [] - Poor Attendance        [x] Patient progressing towards LTGs: evident by:     [] Decreased Dyspnea on exertion   [] Decreased Fatigue   [x] Increased PLB        [] Increased DB    [] Decreased pain    [] Increased Pacing     [] Reduced work of breathing, increased SpO2, and decreased PDS, PES, and PPS  [] Improved ventilatory muscle (diaphragm) strength and endurance    [] Improved Aerobic capacity and endurance     [] Cigarette reduction / Smoking cessation participation      [] Other:              Plan:       [x] - Continue as written   OR    [] - Adjust treatment plan as follows:       Billing:    # __8___     # _____     # _____ Other       _115____ Minutes of Individualized Treatment ()        _____ Minutes of Group Treatment (Laure.Mean)        _____ Minutes of Other (________________________________)             Physician supervision provided this date of service by: Dr. Marguerite Javed       Therapist Signature:Madiha Begum RT    Therapist PRINTED Name and Credential: RT Horacio    8/10/2018  2:38 PM 9

## 2019-11-27 NOTE — CHART NOTE - NSCHARTNOTEFT_GEN_A_CORE
pt feeling pressure in rectum  ve 10/100/+2  fht 145, mod variability, cat 1  toco ctx q 3-5 min    pt to start pushing

## 2019-11-27 NOTE — CHART NOTE - NSCHARTNOTEFT_GEN_A_CORE
Patient assessed at bedside for increased discomfort.  s/p Stadol x2, last dose 2 hours ago    VS  T(C): 36.6 (11-26-19 @ 21:40)  HR: 79 (11-26-19 @ 23:57)  BP: 107/62 (11-26-19 @ 21:40)  RR: 18 (11-26-19 @ 21:40)  SpO2: 97% (11-26-19 @ 23:57)    SVE: 1.5/80/-3  EFM: 140, mod, +decels  [place]: q2-3min    Plan:  -moving patient down to L&D  -4Epi  -will assess and attempt CB after epidural placement  -resuscitate tracing as necessary    d/w Dr. Kassidy Riley, PGY1

## 2019-11-27 NOTE — DISCHARGE NOTE OB - PATIENT PORTAL LINK FT
You can access the FollowMyHealth Patient Portal offered by [facility] by registering at the following website: [email or web address]. By joining [facility]’s FollowMyHealth portal, you will also be able to view your health information using other applications (apps) compatible with our system.

## 2019-11-27 NOTE — OB PROVIDER DELIVERY SUMMARY - NSPROVIDERDELIVERYNOTE_OBGYN_ALL_OB_FT
pt became fd and pushed to deliver liveborn male apgars 9/9 6lb 9oz over 2nd degree. nuchal cord x1 loose. placenta delivered spontaneously and intact with 3vc  laceration repaired with 2-0 chromic  ebl 300ml

## 2019-11-27 NOTE — DISCHARGE NOTE OB - CARE PROVIDER_API CALL
Gloria Yi)  Obstetrics and Gynecology  [address] [place] [address], [address]  [place] [address]  Phone: [phone]  Fax: [phone]  Established Patient  Follow Up Time: 2 weeks

## 2019-11-27 NOTE — CHART NOTE - NSCHARTNOTEFT_GEN_A_CORE
Patient assessed at bedside for deceleration.  Comfortable with epidural placement.  FHR resuscitated.      VS  T(C): 36.9 (11-27-19 @ 02:35)  HR: 85 (11-27-19 @ 03:21)  BP: 128/76 (11-27-19 @ 03:05)  RR: 18 (11-26-19 @ 21:40)  SpO2: 100% (11-27-19 @ 03:17)    SVE: 8/100/-1    Dr. Yi has been informed - will anticipate vaginal delivery    Kolby Riley, PGY1

## 2019-11-28 RX ADMIN — Medication 600 MILLIGRAM(S): at 18:01

## 2019-11-28 RX ADMIN — Medication 975 MILLIGRAM(S): at 09:30

## 2019-11-28 RX ADMIN — Medication 600 MILLIGRAM(S): at 11:40

## 2019-11-28 RX ADMIN — Medication 600 MILLIGRAM(S): at 05:20

## 2019-11-28 RX ADMIN — Medication 600 MILLIGRAM(S): at 23:42

## 2019-11-28 RX ADMIN — Medication 1 TABLET(S): at 11:40

## 2019-11-28 RX ADMIN — Medication 600 MILLIGRAM(S): at 17:12

## 2019-11-28 RX ADMIN — Medication 975 MILLIGRAM(S): at 21:40

## 2019-11-28 RX ADMIN — Medication 600 MILLIGRAM(S): at 12:30

## 2019-11-28 RX ADMIN — SODIUM CHLORIDE 3 MILLILITER(S): 9 INJECTION INTRAMUSCULAR; INTRAVENOUS; SUBCUTANEOUS at 05:18

## 2019-11-28 RX ADMIN — Medication 600 MILLIGRAM(S): at 06:20

## 2019-11-28 RX ADMIN — Medication 975 MILLIGRAM(S): at 10:22

## 2019-11-28 RX ADMIN — Medication 975 MILLIGRAM(S): at 20:40

## 2019-11-28 NOTE — PROGRESS NOTE ADULT - SUBJECTIVE AND OBJECTIVE BOX
S: Patient doing well. Minimal lochia. Pain controlled.    O: Vital Signs Last 24 Hrs  T(C): 36.6 (2019 06:00), Max: 36.7 (2019 22:27)  T(F): 97.8 (2019 06:00), Max: 98.1 (2019 22:27)  HR: 72 (2019 06:00) (72 - 95)  BP: 119/77 (2019 06:00) (119/77 - 127/85)  BP(mean): --  RR: 16 (2019 06:00) (16 - 17)  SpO2: 100% (2019 06:00) (100% - 100%)    Gen: NAD  Abd: soft, NT, ND, fundus firm below umbilicus  Lochia: moderate  Ext: no tenderness    Labs:                        13.3   9.25  )-----------( 231      ( 2019 22:20 )             39.5       A: 29y PPD# 1 s/p  doing well.    Plan: continue pp care Plan for discharge in am

## 2019-11-28 NOTE — LACTATION INITIAL EVALUATION - INTERVENTION OUTCOME
Reviewed feeding on demand, recognizing feeding cues and utilizing feeding log. Techniques to facilitate deep latch discussed. Safe skin to skin, safe sleep and rooming in promoted.

## 2019-11-28 NOTE — PROGRESS NOTE ADULT - SUBJECTIVE AND OBJECTIVE BOX
Anesthesia Post-op Note    POD#1 S/P vaginal delivery    Patient is doing well.  OOBAA. Tolerating clears.  Pain is tolerable.  No residual anesthetic issues or complications noted.    Kylah Garcia CRNA

## 2019-11-28 NOTE — PROGRESS NOTE ADULT - SUBJECTIVE AND OBJECTIVE BOX
OB Progress Note:  PPD#1    S: 28yo  PPD#1 s/p  c/b PEC and hypokalemia. Patient feels well. Pain is well controlled. She is tolerating a regular diet and passing flatus. She is voiding spontaneously, and ambulating without difficulty. Denies CP/SOB. Denies lightheadedness/dizziness. Denies N/V.  Patient potassium has been repleated and is now stable.    O:  Vitals:  Vital Signs Last 24 Hrs  T(C): 36.6 (2019 06:00), Max: 36.7 (2019 14:00)  T(F): 97.8 (2019 06:00), Max: 98.1 (2019 14:00)  HR: 72 (:) (72 - 106)  BP: 119/77 (2019 06:00) (119/77 - 127/85)  BP(mean): --  RR: 16 (2019 06:00) (16 - 18)  SpO2: 100% (2019 06:00) (96% - 100%)    MEDICATIONS  (STANDING):  acetaminophen   Tablet .. 975 milliGRAM(s) Oral <User Schedule>  diphtheria/tetanus/pertussis (acellular) Vaccine (ADAcel) 0.5 milliLiter(s) IntraMuscular once  ibuprofen  Tablet. 600 milliGRAM(s) Oral every 6 hours  prenatal multivitamin 1 Tablet(s) Oral daily  sodium chloride 0.9% lock flush 3 milliLiter(s) IV Push every 8 hours      Labs:  Blood type: A Positive  Rubella IgG: RPR: Negative                          13.3   9.25 >-----------< 231    (  @ 22:20 )             39.5                        13.3   5.36 >-----------< 244    (  @ 10:15 )             38.5                        13.9   8.48 >-----------< 254    (  @ 22:40 )             39.3    19 @ 17:55      138  |  107  |  8   ----------------------------<  134<H>  3.8   |  18<L>  |  0.64    19 @ 22:20      140  |  112<H>  |  3<L>  ----------------------------<  113<H>  3.1<L>   |  17<L>  |  0.39<L>    19 @ 10:15      138  |  108<H>  |  5<L>  ----------------------------<  87  3.4<L>   |  16<L>  |  0.41<L>    19 @ 22:40      137  |  104  |  6<L>  ----------------------------<  85  3.3<L>   |  18<L>  |  0.44<L>        Ca    8.4      2019 17:55  Ca    8.5      2019 22:20  Ca    8.3<L>      2019 10:15  Ca    8.9      2019 22:40    TPro  6.1  /  Alb  3.1<L>  /  TBili  0.5  /  DBili  x   /  AST  15  /  ALT  8   /  AlkPhos  127<H>  19 @ 22:20  TPro  6.0  /  Alb  3.4  /  TBili  0.5  /  DBili  x   /  AST  15  /  ALT  7   /  AlkPhos  124<H>  19 @ 10:15  TPro  6.9  /  Alb  3.6  /  TBili  0.6  /  DBili  x   /  AST  18  /  ALT  9   /  AlkPhos  136<H>  19 @ 22:40          Physical Exam:  General: NAD  Abdomen: soft, non-tender, non-distended, fundus firm  Vaginal: Lochia wnl  Extremities: No erythema/edema

## 2019-11-29 VITALS
RESPIRATION RATE: 16 BRPM | DIASTOLIC BLOOD PRESSURE: 81 MMHG | OXYGEN SATURATION: 100 % | HEART RATE: 73 BPM | SYSTOLIC BLOOD PRESSURE: 126 MMHG | TEMPERATURE: 98 F

## 2019-11-29 RX ADMIN — Medication 975 MILLIGRAM(S): at 06:11

## 2019-11-29 RX ADMIN — Medication 600 MILLIGRAM(S): at 00:40

## 2019-11-29 RX ADMIN — Medication 975 MILLIGRAM(S): at 05:22

## 2019-11-29 NOTE — PROGRESS NOTE ADULT - ASSESSMENT
30yo PPD#2 s/p . Pregnancy c/b PEC. Adequate BP control without antihypertensive medications. Patient is stable and doing well post-partum.

## 2019-11-29 NOTE — PROGRESS NOTE ADULT - PROBLEM SELECTOR PLAN 1
- Monitor BP  - Pain well controlled, continue current pain regimen  - Increase ambulation as tolerated  - Continue regular diet  - Discharge planning     Henny Al PGY-1  #[id number]

## 2019-11-29 NOTE — PROGRESS NOTE ADULT - ATTENDING COMMENTS
Attending Note   Agree with above   REports no HA, vision changes, RUQ or epigastric pain   Bleeding minimal   Breastfeeding   BPS as above  GEn in NAD   Abd soft, fundus firm  Perineum healing well   Ext: trace edema b/l     A/P: PPD 2 s/p  cw pre-eclampsia doing well   d/c home today   BP cuff  f/u in 1 week for BP check in office

## 2019-11-29 NOTE — PROGRESS NOTE ADULT - SUBJECTIVE AND OBJECTIVE BOX
OB Progress Note:  PPD#2    S: 28yo PPD#2 s/p . Patient feels well. Pain is well controlled. She is tolerating a regular diet, voiding spontaneously, and ambulating without difficulty. Denies CP/SOB. Denies lightheadedness/dizziness. Denies N/V. Denies HA/changes in vision/RUQ or epigastric pain.    O:  Vital Signs Last 24 Hrs  T(C): 36.5 (2019 06:00), Max: 36.6 (2019 17:28)  T(F): 97.7 (2019 06:00), Max: 97.8 (2019 17:28)  HR: 73 (2019 06:00) (69 - 88)  BP: 126/81 (2019 06:00) (116/84 - 140/82)  BP(mean): --  RR: 16 (2019 06:00) (16 - 17)  SpO2: 100% (2019 06:00) (99% - 100%)    Physical Exam:  General: NAD  Abdomen: soft, non-tender, non-distended, fundus firm  Vaginal: Lochia wnl  Extremities: No erythema/edema    MEDICATIONS  (STANDING):  acetaminophen   Tablet .. 975 milliGRAM(s) Oral <User Schedule>  diphtheria/tetanus/pertussis (acellular) Vaccine (ADAcel) 0.5 milliLiter(s) IntraMuscular once  ibuprofen  Tablet. 600 milliGRAM(s) Oral every 6 hours  prenatal multivitamin 1 Tablet(s) Oral daily  sodium chloride 0.9% lock flush 3 milliLiter(s) IV Push every 8 hours    MEDICATIONS  (PRN):  benzocaine 20%/menthol 0.5% Spray 1 Spray(s) Topical every 6 hours PRN for Perineal discomfort  dibucaine 1% Ointment 1 Application(s) Topical every 6 hours PRN Perineal discomfort  diphenhydrAMINE 25 milliGRAM(s) Oral every 6 hours PRN Pruritus  glycerin Suppository - Adult 1 Suppository(s) Rectal at bedtime PRN Constipation  hydrocortisone 1% Cream 1 Application(s) Topical every 6 hours PRN Moderate Pain (4-6)  lanolin Ointment 1 Application(s) Topical every 6 hours PRN nipple soreness  magnesium hydroxide Suspension 30 milliLiter(s) Oral two times a day PRN Constipation  pramoxine 1%/zinc 5% Cream 1 Application(s) Topical every 4 hours PRN Moderate Pain (4-6)  simethicone 80 milliGRAM(s) Chew every 4 hours PRN Gas  witch hazel Pads 1 Application(s) Topical every 4 hours PRN Perineal discomfort      Labs:  Blood type: A Positive  Rubella IgG: RPR: Negative                          13.3   9.25 >-----------< 231    (  @ 22:20 )             39.5                        13.3   5.36 >-----------< 244    (  @ 10:15 )             38.5    19 @ 17:55      138  |  107  |  8   ----------------------------<  134<H>  3.8   |  18<L>  |  0.64    19 @ 22:20      140  |  112<H>  |  3<L>  ----------------------------<  113<H>  3.1<L>   |  17<L>  |  0.39<L>    19 @ 10:15      138  |  108<H>  |  5<L>  ----------------------------<  87  3.4<L>   |  16<L>  |  0.41<L>        Ca    8.4      2019 17:55  Ca    8.5      2019 22:20  Ca    8.3<L>      2019 10:15    TPro  6.1  /  Alb  3.1<L>  /  TBili  0.5  /  DBili  x   /  AST  15  /  ALT  8   /  AlkPhos  127<H>  19 @ 22:20  TPro  6.0  /  Alb  3.4  /  TBili  0.5  /  DBili  x   /  AST  15  /  ALT  7   /  AlkPhos  124<H>  19 @ 10:15

## 2019-12-05 ENCOUNTER — APPOINTMENT (OUTPATIENT)
Dept: MATERNAL FETAL MEDICINE | Facility: CLINIC | Age: 29
End: 2019-12-05

## 2019-12-12 ENCOUNTER — APPOINTMENT (OUTPATIENT)
Dept: ANTEPARTUM | Facility: HOSPITAL | Age: 29
End: 2019-12-12

## 2019-12-12 ENCOUNTER — APPOINTMENT (OUTPATIENT)
Dept: ANTEPARTUM | Facility: CLINIC | Age: 29
End: 2019-12-12

## 2020-01-29 NOTE — OB PROVIDER IHI INDUCTION/AUGMENTATION NOTE - NS_CHECKALL_OBGYN_ALL_OB
H&P was completed/FHR was reviewed/Induction / Augmentation was discussed/Order was written/Contractions pattern was reviewed Retinoid Dermatitis Normal Treatment: I recommended more frequent application of Cetaphil or CeraVe to the areas of dermatitis.

## 2020-10-27 ENCOUNTER — TRANSCRIPTION ENCOUNTER (OUTPATIENT)
Age: 30
End: 2020-10-27

## 2022-10-19 NOTE — DISCHARGE NOTE OB - FOUL SMELLING VAGINAL DISCHARGE
Statement Selected
oriented to person, place, time and situation
oriented to person, place, time and situation

## 2023-06-26 NOTE — OB RN TRIAGE NOTE - TEMPERATURE IN FAHRENHEIT (DEGREES F)
Monitor: The problem is stable.  Evaluation: Labs/tests ordered, see encounter summary.  Assessment/Treatment:  Hemoglobin A1c done at the office today is 6.1% continue same management.   98.1

## 2023-11-28 ENCOUNTER — NON-APPOINTMENT (OUTPATIENT)
Age: 33
End: 2023-11-28

## 2025-01-13 ENCOUNTER — NON-APPOINTMENT (OUTPATIENT)
Age: 35
End: 2025-01-13

## 2025-06-06 ENCOUNTER — NON-APPOINTMENT (OUTPATIENT)
Age: 35
End: 2025-06-06

## 2025-07-03 ENCOUNTER — NON-APPOINTMENT (OUTPATIENT)
Age: 35
End: 2025-07-03